# Patient Record
Sex: MALE | Race: WHITE
[De-identification: names, ages, dates, MRNs, and addresses within clinical notes are randomized per-mention and may not be internally consistent; named-entity substitution may affect disease eponyms.]

---

## 2018-04-29 ENCOUNTER — HOSPITAL ENCOUNTER (EMERGENCY)
Dept: HOSPITAL 25 - UCEAST | Age: 23
Discharge: HOME | End: 2018-04-29
Payer: SELF-PAY

## 2018-04-29 VITALS — SYSTOLIC BLOOD PRESSURE: 118 MMHG | DIASTOLIC BLOOD PRESSURE: 65 MMHG
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
## 2018-04-29 DIAGNOSIS — S61.412A: Primary | ICD-10-CM
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
## 2018-04-29 DIAGNOSIS — Z23: ICD-10-CM
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
## 2018-04-29 PROCEDURE — 90715 TDAP VACCINE 7 YRS/> IM: CPT

## 2018-04-29 PROCEDURE — 12001 RPR S/N/AX/GEN/TRNK 2.5CM/<: CPT

## 2018-04-29 PROCEDURE — G0463 HOSPITAL OUTPT CLINIC VISIT: HCPCS

## 2018-04-29 PROCEDURE — 99201: CPT

## 2018-04-29 NOTE — UC
Laceration HPI





- HPI Summary


HPI Summary: 





Pt presents with left hand laceration sustained today at 0700 at work. He was 

doing something with the coffee machine and his hand slipped and sliced on a 

piece of metal. Thinks last tetanus was many years ago. 











- History Of Current Complaint


Stated Complaint: HAND LACERATION


Time Seen by Provider: 04/29/18 12:23


Hx Obtained From: Patient


Laceration Location: Hand


Mechanism Of Injury: Sharp Trauma


Onset/Duration: Sudden Onset


Severity: Mild


Pain Intensity: 1


Pain Scale Used: 0-10 Numeric





- Allergies/Home Medications


Allergies/Adverse Reactions: 


 Allergies











Allergy/AdvReac Type Severity Reaction Status Date / Time


 


No Known Allergies Allergy   Verified 04/29/18 12:33











Home Medications: 


 Home Medications





NK [No Home Medications Reported]  04/29/18 [History Confirmed 04/29/18]











PMH/Surg Hx/FS Hx/Imm Hx





- Additional Past Medical History


Additional PMH: 





None


Previously Healthy: Yes





- Surgical History


Surgical History: None





- Family History


Known Family History: Positive: None





- Social History


Occupation: Employed Full-time


Lives: With Family


Alcohol Use: None


Substance Use Type: None


Smoking Status (MU): Never Smoked Tobacco





Review of Systems


Constitutional: Negative


Skin: Other - Laceration left palm


Respiratory: Negative


Cardiovascular: Negative


Neurovascular: Negative


Neurological: Negative


Psychological: Negative


All Other Systems Reviewed And Are Negative: Yes





Physical Exam





- Summary


Physical Exam Summary: 





 


GENERAL: NAD. WDWN. No pain distress.


SKIN: 4mm superficial linear laceration to palmar aspect of left hand at 2nd 

MCP. Well approximated. No FB.


NECK: Supple. Nontender. No lymphadenopathy. 


CHEST:  No accessory muscle use. Breathing comfortably and in no distress.


CV:  Pulses intact. Brisk cap refill.


MSK: Left hand and all fingers FROM.


NEURO: Alert. CN II-XII grossly intact.


PSYCH: Age appropriate behavior.


Triage Information Reviewed: Yes





Laceration Repair





- Laceration Repair


  ** 1


Description: Linear


Laceration Size After Repair: Length (cm) - 0.4


Modified For Repair: No


Irrigation With Pressure Irrigation Device: Yes


Closure Material: Skin Adhesive





Laceration Course/Dx





- Course/Dx


Course Of Treatment: Wound irrigated with 100mL NS. tdap updated. Superficial 

lac approximated and glued with dermabond. Band-aid applied.





- Differential Dx - Laceration/Wound


Provider Diagnoses: 4mm superficial laceration to left hand





Discharge





- Sign-Out/Discharge


Documenting (check all that apply): Discharge/Admit/Transfer





- Discharge Plan


Condition: Stable


Disposition: HOME


Patient Education Materials:  Laceration (DC), Skin Adhesive Care (ED)


Forms:  *Work Release


Referrals: 


No Primary Care Phys,NOPCP [Primary Care Provider] - 


Additional Instructions: 


If you develop a fever, shortness of breath, chest pain, new or worsening 

symptoms - please call your PCP or go to the ED.


 


1) Please keep your dressing clean, dry, and intact for the next 24 hours.


2) Tomorrow may take dressing off and apply a band-aid. Please keep covered 

while working until fully healed.








- Billing Disposition and Condition


Condition: STABLE


Disposition: HOME